# Patient Record
Sex: MALE | Race: OTHER | HISPANIC OR LATINO | Employment: UNEMPLOYED | URBAN - METROPOLITAN AREA
[De-identification: names, ages, dates, MRNs, and addresses within clinical notes are randomized per-mention and may not be internally consistent; named-entity substitution may affect disease eponyms.]

---

## 2023-09-09 ENCOUNTER — HOSPITAL ENCOUNTER (EMERGENCY)
Facility: HOSPITAL | Age: 23
Discharge: HOME | End: 2023-09-09
Attending: EMERGENCY MEDICINE | Admitting: EMERGENCY MEDICINE
Payer: MEDICARE

## 2023-09-09 VITALS
TEMPERATURE: 98.1 F | RESPIRATION RATE: 16 BRPM | DIASTOLIC BLOOD PRESSURE: 73 MMHG | SYSTOLIC BLOOD PRESSURE: 130 MMHG | HEART RATE: 73 BPM | OXYGEN SATURATION: 98 % | WEIGHT: 135.14 LBS

## 2023-09-09 DIAGNOSIS — N48.1 BALANITIS: Primary | ICD-10-CM

## 2023-09-09 LAB — GLUCOSE SERPL-MCNC: 105 MG/DL (ref 65–140)

## 2023-09-09 PROCEDURE — 99284 EMERGENCY DEPT VISIT MOD MDM: CPT | Performed by: EMERGENCY MEDICINE

## 2023-09-09 PROCEDURE — 87491 CHLMYD TRACH DNA AMP PROBE: CPT | Performed by: EMERGENCY MEDICINE

## 2023-09-09 PROCEDURE — 82948 REAGENT STRIP/BLOOD GLUCOSE: CPT

## 2023-09-09 PROCEDURE — 87591 N.GONORRHOEAE DNA AMP PROB: CPT | Performed by: EMERGENCY MEDICINE

## 2023-09-09 PROCEDURE — 99283 EMERGENCY DEPT VISIT LOW MDM: CPT

## 2023-09-09 RX ORDER — CLOTRIMAZOLE 1 %
CREAM (GRAM) TOPICAL 2 TIMES DAILY
Qty: 12 G | Refills: 0 | Status: SHIPPED | OUTPATIENT
Start: 2023-09-09 | End: 2023-09-16

## 2023-09-09 NOTE — DISCHARGE INSTRUCTIONS
Balanitis  LO QUE NECESITAS SABER:  La balanitis es la inflamación del glande (gerber) del pene. Generalmente es causada por padma bacteria o un hongo. Pebbles un seguimiento con quach proveedor de atención médica en 1 a 2 semanas: escriba joe preguntas para que recuerde preguntarlas solomon joe visitas. Póngase en contacto con quach proveedor de atención médica si:  Tienes richardson. Tienes dolor al Joyce. Regrese al departamento de emergencias si:  No puede orinar.

## 2023-09-09 NOTE — ED PROVIDER NOTES
History  Chief Complaint   Patient presents with   • Penis Pain     Reports pain, swelling, and itching believes it may be because he was never circumcised      19 yo M c/o itching, burning, discomfort of the head of his penis, under the foreskin for two weeks. Two weeks ago the foreskin was swollen, and he had to forcibly pull it back, causing a tear on the underside, which has been painful and itching. He has had sexual intercourse in the past, he has never been tested or treated for STD. He denies fever, chills. History provided by:  Patient   used: Yes ([facility] [id number])        None       Past Medical History:   Diagnosis Date   • Tonsillitis        History reviewed. No pertinent surgical history. History reviewed. No pertinent family history. I have reviewed and agree with the history as documented. E-Cigarette/Vaping   • E-Cigarette Use Former User      E-Cigarette/Vaping Substances     Social History     Tobacco Use   • Smoking status: Never   • Smokeless tobacco: Never   Vaping Use   • Vaping Use: Former   Substance Use Topics   • Alcohol use: Yes     Comment: occ   • Drug use: Not Currently       Review of Systems    Physical Exam  Physical Exam  Vitals and nursing note reviewed. Exam conducted with a chaperone present (Patient's sister remained in the room). Constitutional:       Appearance: He is well-developed. HENT:      Head: Normocephalic and atraumatic. Right Ear: External ear normal.      Left Ear: External ear normal.      Nose: Nose normal.   Eyes:      Conjunctiva/sclera: Conjunctivae normal.      Pupils: Pupils are equal, round, and reactive to light. Cardiovascular:      Rate and Rhythm: Normal rate. Pulmonary:      Effort: Pulmonary effort is normal.   Abdominal:      Tenderness: There is no abdominal tenderness. Genitourinary:     Penis: Uncircumcised. Lesions (tear on the underside at the frenulum of skin ) present.        Testes: Normal. Musculoskeletal:         General: Normal range of motion. Cervical back: Normal range of motion and neck supple. Lymphadenopathy:      Lower Body: No right inguinal adenopathy. No left inguinal adenopathy. Skin:     General: Skin is warm and dry. Neurological:      Mental Status: He is alert and oriented to person, place, and time. Cranial Nerves: No cranial nerve deficit. Coordination: Coordination normal.   Psychiatric:         Behavior: Behavior normal.         Thought Content: Thought content normal.         Judgment: Judgment normal.         Vital Signs  ED Triage Vitals   Temperature Pulse Respirations Blood Pressure SpO2   09/09/23 1404 09/09/23 1404 09/09/23 1404 09/09/23 1404 09/09/23 1404   98.1 °F (36.7 °C) 73 16 130/73 98 %      Temp Source Heart Rate Source Patient Position - Orthostatic VS BP Location FiO2 (%)   09/09/23 1404 09/09/23 1404 09/09/23 1404 09/09/23 1404 --   Oral Monitor Sitting Right arm       Pain Score       09/09/23 1406       7           Vitals:    09/09/23 1404   BP: 130/73   Pulse: 73   Patient Position - Orthostatic VS: Sitting         Visual Acuity      ED Medications  Medications - No data to display    Diagnostic Studies  Results Reviewed     Procedure Component Value Units Date/Time    Chlamydia/GC amplified DNA by PCR [[id number]] Collected: 09/09/23 1443    Lab Status:  In process Specimen: Urine, Other Updated: 09/09/23 1448    Fingerstick Glucose (POCT) [[id number]]  (Normal) Collected: 09/09/23 1444    Lab Status: Final result Updated: 09/09/23 1445     POC Glucose 105 mg/dl                  No orders to display              Procedures  Procedures         ED Course  ED Course as of 09/09/23 1623   Sat Sep 09, 2023   1500 POC Glucose: 105  normal                                             MDM    Disposition  Final diagnoses:   Balanitis     Time reflects when diagnosis was documented in both MDM as applicable and the Disposition within this note Time User Action Codes Description Comment    9/9/2023  3:06 PM Tierra Schroeder Add [N48.1] Balanitis       ED Disposition     ED Disposition   Discharge    Condition   Good    Date/Time   Sat Sep 9, 2023  3:08 PM    Comment   Gwendolyn Reddy discharge to home/self care.                Follow-up Information     Follow up With Specialties Details Why Contact Info Additional [address] [facility] Schedule an appointment as soon as possible for a visit  For followup [facility]  [address] [place] [id number]  [address] [place] [address] For Urology [facility], [address] [place] [address], [place], [place], [id number] [phone]          Discharge Medication List as of 9/9/2023  3:08 PM      START taking these medications    Details   clotrimazole (LOTRIMIN) 1 % cream Apply topically 2 (two) times a day for 7 days, Starting Sat 9/9/2023, Until Sat 9/16/2023, Print                 PDMP Review     None          ED Provider  Electronically Signed by           Kerry Pina MD  09/09/23 5750

## 2023-09-11 LAB
C TRACH DNA SPEC QL NAA+PROBE: NEGATIVE
N GONORRHOEA DNA SPEC QL NAA+PROBE: NEGATIVE

## 2023-10-16 ENCOUNTER — TELEPHONE (OUTPATIENT)
Age: 23
End: 2023-10-16

## 2023-10-16 NOTE — TELEPHONE ENCOUNTER
New Patient    What is the reason for the patient’s appointment?:Patient's aunt called stating patient was in the ER with balanitis and patient would like to schedule appointment to discuss circumcision.      Patient's aunt Gladis Vuong can be reached at [phone]    What office location does the patient prefer?:[place]    Does patient have Imaging/Lab Results:    Have patient records been requested?:  If No, are the records showing in Epic:       INSURANCE:   Do we accept the patient's insurance or is the patient Self-Pay?:    Insurance Provider:Self pay   Plan Type/Number:   Member ID#:       HISTORY:   Has the patient had any previous Urologist(s)?:no     Was the patient seen in the ED?:09/09/23    Has the patient had any outside testing done?:    Does the patient have a personal history of cancer?:no

## 2023-11-14 ENCOUNTER — TELEPHONE (OUTPATIENT)
Dept: UROLOGY | Facility: MEDICAL CENTER | Age: 23
End: 2023-11-14

## 2023-11-14 NOTE — TELEPHONE ENCOUNTER
Spoke to Shilo and explained we are non-par with his [place] insurance - [facility] community plan;  appt cancelled and instructed he would need to be seen by physician who participates with his insurance.

## 2023-12-20 ENCOUNTER — TELEPHONE (OUTPATIENT)
Dept: UROLOGY | Facility: MEDICAL CENTER | Age: 23
End: 2023-12-20

## 2023-12-20 NOTE — TELEPHONE ENCOUNTER
Spoke to patient's aunt.  Explained he cannot be a self-pay patient since he has a medical assistance plan ([facility]).  Appointment cancelled here and told he must see a provider who takes his insurance.

## 2024-11-25 ENCOUNTER — HOSPITAL ENCOUNTER (EMERGENCY)
Facility: HOSPITAL | Age: 24
Discharge: HOME | End: 2024-11-25
Attending: EMERGENCY MEDICINE

## 2024-11-25 ENCOUNTER — APPOINTMENT (EMERGENCY)
Dept: CT IMAGING | Facility: HOSPITAL | Age: 24
End: 2024-11-25

## 2024-11-25 VITALS
HEART RATE: 82 BPM | OXYGEN SATURATION: 100 % | TEMPERATURE: 98.4 F | DIASTOLIC BLOOD PRESSURE: 74 MMHG | SYSTOLIC BLOOD PRESSURE: 134 MMHG | WEIGHT: 143.74 LBS | RESPIRATION RATE: 12 BRPM

## 2024-11-25 DIAGNOSIS — R51.9 ACUTE HEADACHE: Primary | ICD-10-CM

## 2024-11-25 DIAGNOSIS — R11.10 VOMITING: ICD-10-CM

## 2024-11-25 LAB
ATRIAL RATE: 75 BPM
P AXIS: 80 DEGREES
PR INTERVAL: 134 MS
QRS AXIS: 89 DEGREES
QRSD INTERVAL: 88 MS
QT INTERVAL: 362 MS
QTC INTERVAL: 404 MS
T WAVE AXIS: 66 DEGREES
VENTRICULAR RATE: 75 BPM

## 2024-11-25 PROCEDURE — 93010 ELECTROCARDIOGRAM REPORT: CPT | Performed by: INTERNAL MEDICINE

## 2024-11-25 PROCEDURE — 96375 TX/PRO/DX INJ NEW DRUG ADDON: CPT

## 2024-11-25 PROCEDURE — 70450 CT HEAD/BRAIN W/O DYE: CPT

## 2024-11-25 PROCEDURE — 96365 THER/PROPH/DIAG IV INF INIT: CPT

## 2024-11-25 PROCEDURE — 99284 EMERGENCY DEPT VISIT MOD MDM: CPT

## 2024-11-25 PROCEDURE — 96372 THER/PROPH/DIAG INJ SC/IM: CPT

## 2024-11-25 PROCEDURE — 93005 ELECTROCARDIOGRAM TRACING: CPT

## 2024-11-25 PROCEDURE — 99284 EMERGENCY DEPT VISIT MOD MDM: CPT | Performed by: EMERGENCY MEDICINE

## 2024-11-25 RX ORDER — DIPHENHYDRAMINE HYDROCHLORIDE 50 MG/ML
25 INJECTION INTRAMUSCULAR; INTRAVENOUS ONCE
Status: COMPLETED | OUTPATIENT
Start: 2024-11-25 | End: 2024-11-25

## 2024-11-25 RX ORDER — MAGNESIUM SULFATE HEPTAHYDRATE 40 MG/ML
2 INJECTION, SOLUTION INTRAVENOUS ONCE
Status: COMPLETED | OUTPATIENT
Start: 2024-11-25 | End: 2024-11-25

## 2024-11-25 RX ORDER — METOCLOPRAMIDE HYDROCHLORIDE 5 MG/ML
10 INJECTION INTRAMUSCULAR; INTRAVENOUS ONCE
Status: COMPLETED | OUTPATIENT
Start: 2024-11-25 | End: 2024-11-25

## 2024-11-25 RX ORDER — METOCLOPRAMIDE 10 MG/1
10 TABLET ORAL EVERY 6 HOURS PRN
Qty: 8 TABLET | Refills: 0 | Status: SHIPPED | OUTPATIENT
Start: 2024-11-25

## 2024-11-25 RX ORDER — SUMATRIPTAN 6 MG/.5ML
6 INJECTION, SOLUTION SUBCUTANEOUS ONCE
Status: COMPLETED | OUTPATIENT
Start: 2024-11-25 | End: 2024-11-25

## 2024-11-25 RX ORDER — DEXAMETHASONE SODIUM PHOSPHATE 10 MG/ML
10 INJECTION, SOLUTION INTRAMUSCULAR; INTRAVENOUS ONCE
Status: COMPLETED | OUTPATIENT
Start: 2024-11-25 | End: 2024-11-25

## 2024-11-25 RX ADMIN — METOCLOPRAMIDE 10 MG: 5 INJECTION, SOLUTION INTRAMUSCULAR; INTRAVENOUS at 15:16

## 2024-11-25 RX ADMIN — DEXAMETHASONE SODIUM PHOSPHATE 10 MG: 10 INJECTION INTRAMUSCULAR; INTRAVENOUS at 15:22

## 2024-11-25 RX ADMIN — SUMATRIPTAN 6 MG: 6 INJECTION, SOLUTION SUBCUTANEOUS at 15:19

## 2024-11-25 RX ADMIN — MAGNESIUM SULFATE HEPTAHYDRATE 2 G: 40 INJECTION, SOLUTION INTRAVENOUS at 15:23

## 2024-11-25 RX ADMIN — DIPHENHYDRAMINE HYDROCHLORIDE 25 MG: 50 INJECTION INTRAMUSCULAR; INTRAVENOUS at 15:16

## 2024-11-25 RX ADMIN — SODIUM CHLORIDE 1000 ML: 0.9 INJECTION, SOLUTION INTRAVENOUS at 15:16

## 2024-11-25 NOTE — Clinical Note
Juanjo Yang was seen and treated in our emergency department on 11/25/2024.    No restrictions            Diagnosis:     Juanjo  may return to work on return date.    He may return on this date: 11/27/2024         If you have any questions or concerns, please don't hesitate to call.      Dilshad Pina MD    ______________________________           _______________          _______________  Hospital Representative                              Date                                Time

## 2024-11-25 NOTE — ED PROVIDER NOTES
Time reflects when diagnosis was documented in both MDM as applicable and the Disposition within this note       Time User Action Codes Description Comment    11/25/2024  4:39 PM Dilshad Pina Add [R51.9] Acute headache     11/25/2024  4:39 PM Dilshad Pina Add [R11.10] Vomiting           ED Disposition       ED Disposition   Discharge    Condition   Stable    Date/Time   Mon Nov 25, 2024  4:39 PM    Comment   Juanjo SOLIZ Acevedovargas discharge to home/self care.                   Assessment & Plan       Medical Decision Making  Acute headache within 6-hour window to rule out subarachnoid hemorrhage-will do CT head to rule subarachnoid hemorrhage, mass, treat headache, reassess.    Amount and/or Complexity of Data Reviewed  Radiology: ordered.    Risk  Prescription drug management.        ED Course as of 11/25/24 1705   Mon Nov 25, 2024   1630 CT head negative.  It was within the window to rule out acute subarachnoid hemorrhage and LP is not indicated.  Patient is currently asymptomatic and appropriate for discharge to home.       Medications   dexamethasone (PF) (DECADRON) injection 10 mg (10 mg Intravenous Given 11/25/24 1522)   metoclopramide (REGLAN) injection 10 mg (10 mg Intravenous Given 11/25/24 1516)   diphenhydrAMINE (BENADRYL) injection 25 mg (25 mg Intravenous Given 11/25/24 1516)   SUMAtriptan (IMITREX) subcutaneous injection 6 mg (6 mg Subcutaneous Given 11/25/24 1519)   magnesium sulfate 2 g/50 mL IVPB (premix) 2 g (0 g Intravenous Stopped 11/25/24 1620)   sodium chloride 0.9 % bolus 1,000 mL (0 mL Intravenous Stopped 11/25/24 1620)       ED Risk Strat Scores                                               History of Present Illness       Chief Complaint   Patient presents with    Headache     Pt reports working and got right sided facial numbness and vomiting       Past Medical History:   Diagnosis Date    Tonsillitis       History reviewed. No pertinent surgical history.   History reviewed. No  pertinent family history.   Social History     Tobacco Use    Smoking status: Never    Smokeless tobacco: Never   Vaping Use    Vaping status: Former   Substance Use Topics    Alcohol use: Yes     Comment: occ    Drug use: Not Currently      E-Cigarette/Vaping    E-Cigarette Use Former User       E-Cigarette/Vaping Substances      I have reviewed and agree with the history as documented.     24-year-old L presents for evaluation of gradual onset of severe right-sided headache.  Patient describes it as pins-and-needles and stabbing pain behind his right eye.  No true numbness.  Associate with nausea and vomiting.  Moderate in intensity without modifying factors.  No history of similar symptoms.      History provided by:  Patient and friend   used: Yes    Headache  Associated symptoms: nausea and vomiting    Associated symptoms: no abdominal pain, no congestion, no diarrhea, no dizziness, no ear pain, no eye pain, no fatigue, no fever, no myalgias, no numbness, no sore throat and no weakness        Review of Systems   Constitutional:  Negative for activity change, appetite change, fatigue and fever.   HENT:  Negative for congestion, dental problem, ear pain, rhinorrhea and sore throat.    Eyes:  Negative for pain and redness.   Respiratory:  Negative for chest tightness, shortness of breath and wheezing.    Cardiovascular:  Negative for chest pain and palpitations.   Gastrointestinal:  Positive for nausea and vomiting. Negative for abdominal pain, blood in stool, constipation and diarrhea.   Endocrine: Negative for cold intolerance and heat intolerance.   Genitourinary:  Negative for dysuria, frequency and hematuria.   Musculoskeletal:  Negative for arthralgias and myalgias.   Skin:  Negative for color change, pallor and rash.   Neurological:  Positive for headaches. Negative for dizziness, facial asymmetry, weakness and numbness.   Hematological:  Does not bruise/bleed easily.    Psychiatric/Behavioral:  Negative for agitation, hallucinations and suicidal ideas.            Objective       ED Triage Vitals   Temperature Pulse Blood Pressure Respirations SpO2 Patient Position - Orthostatic VS   11/25/24 1434 11/25/24 1434 11/25/24 1434 11/25/24 1434 11/25/24 1434 11/25/24 1434   98.4 °F (36.9 °C) 77 119/75 14 100 % Sitting      Temp src Heart Rate Source BP Location FiO2 (%) Pain Score    -- 11/25/24 1434 11/25/24 1434 -- 11/25/24 1645     Monitor Right arm  No Pain      Vitals      Date and Time Temp Pulse SpO2 Resp BP Pain Score FACES Pain Rating User   11/25/24 1645 -- 82 100 % 12 134/74 No Pain -- SR   11/25/24 1643 -- 92 -- -- -- -- -- SR   11/25/24 1434 98.4 °F (36.9 °C) 77 100 % 14 119/75 -- -- SHUBHAM            Physical Exam  Constitutional:       Appearance: He is well-developed.   HENT:      Head: Normocephalic and atraumatic.   Eyes:      General: No scleral icterus.     Conjunctiva/sclera: Conjunctivae normal.      Pupils: Pupils are equal, round, and reactive to light.   Neck:      Vascular: No JVD.      Trachea: No tracheal deviation.   Pulmonary:      Effort: Pulmonary effort is normal. No respiratory distress.   Abdominal:      General: There is no distension.   Musculoskeletal:         General: No deformity. Normal range of motion.      Cervical back: Normal range of motion and neck supple. No rigidity or tenderness.   Lymphadenopathy:      Cervical: No cervical adenopathy.   Skin:     Coloration: Skin is not pale.      Findings: No erythema or rash.   Neurological:      General: No focal deficit present.      Mental Status: He is alert and oriented to person, place, and time.      Cranial Nerves: No cranial nerve deficit.      Sensory: No sensory deficit.      Motor: No weakness.      Coordination: Coordination normal.      Gait: Gait normal.   Psychiatric:         Behavior: Behavior normal.         Results Reviewed       None            CT head without contrast   Final  Interpretation by Flo Salazar MD (11/25 0114)      No acute intracranial abnormality.                  Resident: Steven Malin I, the attending radiologist, have reviewed the images and agree with the final report above.      Workstation performed: [id number]             Procedures    ED Medication and Procedure Management   Prior to Admission Medications   Prescriptions Last Dose Informant Patient Reported? Taking?   clotrimazole (LOTRIMIN) 1 % cream   No No   Sig: Apply topically 2 (two) times a day for 7 days      Facility-Administered Medications: None     Discharge Medication List as of 11/25/2024  4:40 PM        START taking these medications    Details   metoclopramide (REGLAN) 10 mg tablet Take 1 tablet (10 mg total) by mouth every 6 (six) hours as needed (headache), Starting Mon 11/25/2024, Print           CONTINUE these medications which have NOT CHANGED    Details   clotrimazole (LOTRIMIN) 1 % cream Apply topically 2 (two) times a day for 7 days, Starting Sat 9/9/2023, Until Sat 9/16/2023, Print           No discharge procedures on file.  ED SEPSIS DOCUMENTATION   Time reflects when diagnosis was documented in both MDM as applicable and the Disposition within this note       Time User Action Codes Description Comment    11/25/2024  4:39 PM Dilshad Pina Add [R51.9] Acute headache     11/25/2024  4:39 PM Dilshad Pina Add [R11.10] Vomiting                  Dilshad Pina MD  11/25/24 2405